# Patient Record
Sex: FEMALE | Race: OTHER | Employment: FULL TIME | URBAN - METROPOLITAN AREA
[De-identification: names, ages, dates, MRNs, and addresses within clinical notes are randomized per-mention and may not be internally consistent; named-entity substitution may affect disease eponyms.]

---

## 2017-02-24 ENCOUNTER — HOSPITAL ENCOUNTER (OUTPATIENT)
Age: 22
Discharge: HOME | End: 2017-02-24
Attending: FAMILY MEDICINE
Payer: COMMERCIAL

## 2017-02-24 VITALS
OXYGEN SATURATION: 98 % | TEMPERATURE: 98 F | HEART RATE: 104 BPM | SYSTOLIC BLOOD PRESSURE: 114 MMHG | DIASTOLIC BLOOD PRESSURE: 69 MMHG | RESPIRATION RATE: 16 BRPM

## 2017-02-24 DIAGNOSIS — K52.9 GASTROENTERITIS: Primary | ICD-10-CM

## 2017-02-24 DIAGNOSIS — R42 ORTHOSTATIC DIZZINESS: ICD-10-CM

## 2017-02-24 LAB
#LYMPHOCYTE IC: 0.3 X10ˆ3/UL (ref 0.9–3.2)
#MXD IC: 0.7 X10ˆ3/UL (ref 0.1–1)
#NEUTROPHIL IC: 10.4 X10ˆ3/UL (ref 1.3–6.7)
CREAT SERPL-MCNC: 0.7 MG/DL (ref 0.4–1)
GLUCOSE BLD-MCNC: 101 MG/DL (ref 65–99)
HCT IC: 40.4 % (ref 37–54)
HGB IC: 13.3 G/DL (ref 12–16)
ISTAT BLOOD GAS TCO2: 25 MMOL/L (ref 22–32)
ISTAT BUN: 12 MG/DL (ref 8–20)
ISTAT CHLORIDE: 102 MMOL/L (ref 101–111)
ISTAT HEMATOCRIT: 42 % (ref 37–54)
ISTAT IONIZED CALCIUM: 1.14 MMOL/L (ref 1.12–1.32)
ISTAT POTASSIUM: 3.9 MMOL/L (ref 3.6–5.1)
ISTAT SODIUM: 140 MMOL/L (ref 136–144)
MCH IC: 28.4 PG (ref 27–33.2)
MCHC IC: 32.9 G/DL (ref 31–37)
MCV IC: 86.3 FL (ref 81–100)
PLT IC: 270 X10ˆ3/UL (ref 150–450)
POCT BILIRUBIN URINE: NEGATIVE
POCT BLOOD URINE: NEGATIVE
POCT GLUCOSE URINE: NEGATIVE MG/DL
POCT KETONE URINE: 40 MG/DL
POCT LEUKOCYTE ESTERASE URINE: NEGATIVE
POCT NITRITE URINE: NEGATIVE
POCT PH URINE: 7 (ref 5–8)
POCT SPECIFIC GRAVITY URINE: 1.02
POCT URINE CLARITY: CLEAR
POCT URINE COLOR: YELLOW
POCT URINE PREGNANCY: NEGATIVE
POCT UROBILINOGEN URINE: 0.2 MG/DL
RBC IC: 4.68 X10ˆ6/UL (ref 3.8–5.1)
WBC IC: 11.4 X10ˆ3/UL (ref 4–13)

## 2017-02-24 PROCEDURE — 85025 COMPLETE CBC W/AUTO DIFF WBC: CPT | Performed by: FAMILY MEDICINE

## 2017-02-24 PROCEDURE — 81025 URINE PREGNANCY TEST: CPT | Performed by: FAMILY MEDICINE

## 2017-02-24 PROCEDURE — 99214 OFFICE O/P EST MOD 30 MIN: CPT

## 2017-02-24 PROCEDURE — 80047 BASIC METABLC PNL IONIZED CA: CPT

## 2017-02-24 PROCEDURE — 96361 HYDRATE IV INFUSION ADD-ON: CPT

## 2017-02-24 PROCEDURE — 81002 URINALYSIS NONAUTO W/O SCOPE: CPT | Performed by: FAMILY MEDICINE

## 2017-02-24 PROCEDURE — 96374 THER/PROPH/DIAG INJ IV PUSH: CPT

## 2017-02-24 RX ORDER — ONDANSETRON 4 MG/1
4 TABLET, FILM COATED ORAL EVERY 6 HOURS PRN
Qty: 12 TABLET | Refills: 0 | Status: SHIPPED | OUTPATIENT
Start: 2017-02-24 | End: 2017-02-27

## 2017-02-24 RX ORDER — DICYCLOMINE HCL 20 MG
20 TABLET ORAL ONCE
Status: COMPLETED | OUTPATIENT
Start: 2017-02-24 | End: 2017-02-24

## 2017-02-24 RX ORDER — DICYCLOMINE HCL 20 MG
20 TABLET ORAL
Qty: 12 TABLET | Refills: 0 | Status: SHIPPED | OUTPATIENT
Start: 2017-02-24 | End: 2017-02-27

## 2017-02-24 RX ORDER — SODIUM CHLORIDE 9 MG/ML
1000 INJECTION, SOLUTION INTRAVENOUS ONCE
Status: COMPLETED | OUTPATIENT
Start: 2017-02-24 | End: 2017-02-24

## 2017-02-24 RX ORDER — ONDANSETRON 2 MG/ML
4 INJECTION INTRAMUSCULAR; INTRAVENOUS ONCE
Status: COMPLETED | OUTPATIENT
Start: 2017-02-24 | End: 2017-02-24

## 2017-02-24 NOTE — ED INITIAL ASSESSMENT (HPI)
Patient states since 8 am- gassy and abdominal discomfort- upper mid abdomen  Patient states took Andrzej ol at - 8:30 am thinking she has menstrual cramping- medication didn't work  Belching  12:15 pm- patient states has loose stool with some relief  Patient

## 2017-02-24 NOTE — ED PROVIDER NOTES
Patient Seen in: THE [facility] In [facility] [address]    History   Patient presents with:  Flu    Stated Complaint: FLU LIKE SYMPTOMS    HPI  21-year-old female coming in with complaints of 4 episodes of diarrhea, no blood, and 2-3 episodes of vomiting t from today and agreed except as otherwise stated in HPI.     Physical Exam       ED Triage Vitals   BP 02/24/17 1532 106/56 mmHg   Pulse 02/24/17 1532 108   Resp 02/24/17 1532 16   Temp 02/24/17 1532 98.1 °F (36.7 °C)   Temp src 02/24/17 1532 Temporal   SpO Comments:   Provide patient with a stool kit and educated regarding sample collection at home  0.9%  NaCl infusion   Sig:   ondansetron HCl (ZOFRAN) injection 4 mg   Sig:   Dicyclomine HCl (BENTYL) tab 20 mg   Sig:   Ondansetron HCl (ZOFRAN) 4 mg tablet tablet, R-0    Dicyclomine HCl 20 MG Oral Tab  Take 1 tablet (20 mg total) by mouth 4 (four) times daily before meals and nightly., Normal, Disp-12 tablet, R-0

## 2017-02-26 ENCOUNTER — LAB ENCOUNTER (OUTPATIENT)
Dept: LAB | Facility: HOSPITAL | Age: 22
End: 2017-02-26
Attending: FAMILY MEDICINE
Payer: COMMERCIAL

## 2017-02-26 DIAGNOSIS — R42 ORTHOSTATIC DIZZINESS: ICD-10-CM

## 2017-02-26 DIAGNOSIS — K52.9 GASTROENTERITIS: ICD-10-CM

## 2017-02-26 PROCEDURE — 89055 LEUKOCYTE ASSESSMENT FECAL: CPT

## 2017-02-26 PROCEDURE — 82272 OCCULT BLD FECES 1-3 TESTS: CPT

## 2017-02-26 PROCEDURE — 87045 FECES CULTURE AEROBIC BACT: CPT

## 2017-02-26 PROCEDURE — 87427 SHIGA-LIKE TOXIN AG IA: CPT

## 2017-02-26 PROCEDURE — 87015 SPECIMEN INFECT AGNT CONCNTJ: CPT

## 2017-02-26 PROCEDURE — 87046 STOOL CULTR AEROBIC BACT EA: CPT

## 2017-02-27 NOTE — ED NOTES
Spoke w/ Jones from THE [facility] lab that C-Diff was canceled because it was formed stool.

## 2018-03-30 ENCOUNTER — HOSPITAL ENCOUNTER (OUTPATIENT)
Age: 23
Discharge: HOME | End: 2018-03-30
Payer: COMMERCIAL

## 2018-03-30 VITALS
TEMPERATURE: 98 F | RESPIRATION RATE: 16 BRPM | DIASTOLIC BLOOD PRESSURE: 72 MMHG | HEART RATE: 55 BPM | SYSTOLIC BLOOD PRESSURE: 113 MMHG | OXYGEN SATURATION: 100 %

## 2018-03-30 DIAGNOSIS — J02.0 STREP PHARYNGITIS: Primary | ICD-10-CM

## 2018-03-30 PROCEDURE — 99214 OFFICE O/P EST MOD 30 MIN: CPT

## 2018-03-30 PROCEDURE — 87430 STREP A AG IA: CPT

## 2018-03-30 PROCEDURE — 99213 OFFICE O/P EST LOW 20 MIN: CPT

## 2018-03-30 RX ORDER — AMOXICILLIN 875 MG/1
875 TABLET, COATED ORAL 2 TIMES DAILY
Qty: 20 TABLET | Refills: 0 | Status: SHIPPED | OUTPATIENT
Start: 2018-03-30 | End: 2018-04-09

## 2018-03-30 NOTE — ED PROVIDER NOTES
Patient Seen in: THE [facility] In [facility]    History   No chief complaint on file. Stated Complaint: poss strep, xtoday     HPI    59-year-old female who comes in today complaining of sore throat that began this morning.   She states it wilfrid adenopathy   Lungs: Clear to auscultation bilaterally, respirations unlabored. No wheezing, rales or rhonchi. Heart: NSR, S1, S2 present. No murmurs, rubs or gallops.   Skin: no rash           ED Course     Labs Reviewed   POCT RAPID STREP - Abnormal; Not

## 2018-03-30 NOTE — ED INITIAL ASSESSMENT (HPI)
c/o sore throat started this morning, hurts to swallow. Took some pain medication with some relief. Exposed to mother with strep throat.

## 2018-11-04 ENCOUNTER — HOSPITAL ENCOUNTER (OUTPATIENT)
Age: 23
Discharge: HOME | End: 2018-11-04
Attending: FAMILY MEDICINE
Payer: COMMERCIAL

## 2018-11-04 VITALS
HEART RATE: 93 BPM | DIASTOLIC BLOOD PRESSURE: 66 MMHG | RESPIRATION RATE: 20 BRPM | TEMPERATURE: 98 F | OXYGEN SATURATION: 97 % | SYSTOLIC BLOOD PRESSURE: 119 MMHG

## 2018-11-04 DIAGNOSIS — R51.9 SINUS HEADACHE: ICD-10-CM

## 2018-11-04 DIAGNOSIS — J01.00 ACUTE NON-RECURRENT MAXILLARY SINUSITIS: Primary | ICD-10-CM

## 2018-11-04 PROCEDURE — 99214 OFFICE O/P EST MOD 30 MIN: CPT

## 2018-11-04 PROCEDURE — 99213 OFFICE O/P EST LOW 20 MIN: CPT

## 2018-11-04 RX ORDER — FLUTICASONE PROPIONATE 50 MCG
2 SPRAY, SUSPENSION (ML) NASAL DAILY
Qty: 1 INHALER | Refills: 0 | Status: SHIPPED | OUTPATIENT
Start: 2018-11-04 | End: 2018-12-04

## 2018-11-04 RX ORDER — AMOXICILLIN 875 MG/1
875 TABLET, COATED ORAL EVERY 12 HOURS
Qty: 20 TABLET | Refills: 0 | Status: SHIPPED | OUTPATIENT
Start: 2018-11-04 | End: 2018-11-14

## 2018-11-04 NOTE — ED INITIAL ASSESSMENT (HPI)
C/o headache since Wednesday, hurts more when turning head side to side and looking down. With body aches and chills. Facial rashes started this morning.

## 2018-11-04 NOTE — ED PROVIDER NOTES
Patient Seen in: [facility]    History   Patient presents with:  Headache (neurologic)  Rash    Stated Complaint: FEVER/MIGRAINE     HPI  19-year-old female with her mom presents to immediate care with history of cold cough lan equal, round, and reactive to light. Right eye exhibits no discharge. Left eye exhibits no discharge. Neck: Normal range of motion. Neck supple. No thyromegaly present.    Cardiovascular: Normal rate, regular rhythm, normal heart sounds and intact distal tablet Refills: 0    Fluticasone Propionate 50 MCG/ACT Nasal Suspension  2 sprays by Nasal route daily.   Qty: 1 Inhaler Refills: 0

## 2019-04-05 ENCOUNTER — HOSPITAL ENCOUNTER (OUTPATIENT)
Dept: ULTRASOUND IMAGING | Age: 24
Discharge: HOME | End: 2019-04-05
Attending: FAMILY MEDICINE
Payer: COMMERCIAL

## 2019-04-05 DIAGNOSIS — N93.8 DYSFUNCTIONAL UTERINE BLEEDING: ICD-10-CM

## 2019-04-05 PROCEDURE — 76830 TRANSVAGINAL US NON-OB: CPT | Performed by: FAMILY MEDICINE

## 2019-04-05 PROCEDURE — 76856 US EXAM PELVIC COMPLETE: CPT | Performed by: FAMILY MEDICINE

## 2019-07-05 ENCOUNTER — OFFICE VISIT (OUTPATIENT)
Dept: OBGYN CLINIC | Facility: CLINIC | Age: 24
End: 2019-07-05
Payer: COMMERCIAL

## 2019-07-05 VITALS
DIASTOLIC BLOOD PRESSURE: 60 MMHG | SYSTOLIC BLOOD PRESSURE: 84 MMHG | WEIGHT: 174.63 LBS | BODY MASS INDEX: 32.97 KG/M2 | HEIGHT: 61 IN

## 2019-07-05 DIAGNOSIS — Z30.09 COUNSELING FOR BIRTH CONTROL, ORAL CONTRACEPTIVES: ICD-10-CM

## 2019-07-05 DIAGNOSIS — N92.0 MENORRHAGIA WITH REGULAR CYCLE: Primary | ICD-10-CM

## 2019-07-05 PROCEDURE — 87491 CHLMYD TRACH DNA AMP PROBE: CPT | Performed by: OBSTETRICS & GYNECOLOGY

## 2019-07-05 PROCEDURE — 99203 OFFICE O/P NEW LOW 30 MIN: CPT | Performed by: OBSTETRICS & GYNECOLOGY

## 2019-07-05 PROCEDURE — 87591 N.GONORRHOEAE DNA AMP PROB: CPT | Performed by: OBSTETRICS & GYNECOLOGY

## 2019-07-05 RX ORDER — NORETHINDRONE ACETATE AND ETHINYL ESTRADIOL 1; .02 MG/1; MG/1
1 TABLET ORAL DAILY
Qty: 1 PACKAGE | Refills: 3 | Status: SHIPPED | OUTPATIENT
Start: 2019-07-05 | End: 2019-11-05

## 2019-07-05 RX ORDER — ESCITALOPRAM OXALATE 10 MG/1
10 TABLET ORAL DAILY
COMMUNITY
End: 2020-12-01

## 2019-07-05 RX ORDER — CETIRIZINE HYDROCHLORIDE 10 MG/1
10 TABLET ORAL DAILY
COMMUNITY

## 2019-07-05 NOTE — PROGRESS NOTES
CC: Patient presents with:  Gyn Problem: new patient, heavy periods, wants to discuss birth control      HPI: Nika Barreto is a 24year old  here for consultation regarding her heavy periods and wanting to start on birth control.    Her mother is a positives and negatives noted in the the HPI. Objective:    BP (!) 84/60   Ht 61\"   Wt 174 lb 9.6 oz   LMP 06/16/2019 (Exact Date)   BMI 32.99 kg/m²   Physical Exam    Constitutional: She is oriented to person, place, and time.  She appears well-devel

## 2019-07-07 LAB
C TRACH DNA SPEC QL NAA+PROBE: NEGATIVE
N GONORRHOEA DNA SPEC QL NAA+PROBE: NEGATIVE

## 2019-07-11 ENCOUNTER — TELEPHONE (OUTPATIENT)
Dept: OBGYN CLINIC | Facility: CLINIC | Age: 24
End: 2019-07-11

## 2019-07-11 NOTE — TELEPHONE ENCOUNTER
Pt states pharmacy needs to clarify if birth control should be the 21 day or 28 day     Please contact [address] [place] [address], [address] [place] [address] [facility] [address], [phone], [phone]

## 2019-09-09 RX ORDER — NORETHINDRONE ACETATE AND ETHINYL ESTRADIOL 1; .02 MG/1; MG/1
1 TABLET ORAL DAILY
Qty: 1 PACKAGE | Refills: 3 | OUTPATIENT
Start: 2019-09-09

## 2019-09-09 NOTE — TELEPHONE ENCOUNTER
Last OV: 7/5/19 with Dr. Rachelle Marroquin   Last refill date: 7/5/19  Follow-up: 3 mos for med f/u  Next appt.: 10/14/19    rx on 7/5/19 was for 1 package with 3 refills sent to [facility] in [place]. Refill request received from [facility] in [place].  Request

## 2019-09-09 NOTE — TELEPHONE ENCOUNTER
Last OV: 7/5/19 with Dr. Rosa Roth for gyn problem  Last refill date: 7/5/19 1 pack with 3 refills  Follow-up: 3 mos for annual and med check  Next appt.: 10/14/19    Refill not appropriate.

## 2019-11-05 NOTE — TELEPHONE ENCOUNTER
Pt last seen 7/5/19 and was advised to return in 3 months for f/u. Please schedule appt and route to RN for refill.

## 2019-11-06 RX ORDER — NORETHINDRONE ACETATE AND ETHINYL ESTRADIOL 1; .02 MG/1; MG/1
1 TABLET ORAL DAILY
Qty: 21 TABLET | Refills: 0 | Status: SHIPPED | OUTPATIENT
Start: 2019-11-06 | End: 2019-11-08

## 2019-11-06 NOTE — TELEPHONE ENCOUNTER
Pt scheduled for this Friday.     Future Appointments   Date Time Provider An Annette   11/8/2019 11:00 AM JORGE Diop MD [facility] [address]

## 2019-11-08 ENCOUNTER — OFFICE VISIT (OUTPATIENT)
Dept: OBGYN CLINIC | Facility: CLINIC | Age: 24
End: 2019-11-08
Payer: COMMERCIAL

## 2019-11-08 VITALS
SYSTOLIC BLOOD PRESSURE: 100 MMHG | WEIGHT: 184.81 LBS | HEART RATE: 67 BPM | DIASTOLIC BLOOD PRESSURE: 70 MMHG | BODY MASS INDEX: 35 KG/M2

## 2019-11-08 DIAGNOSIS — Z30.09 GENERAL COUNSELING FOR PRESCRIPTION OF ORAL CONTRACEPTIVES: ICD-10-CM

## 2019-11-08 DIAGNOSIS — Z01.419 WELL WOMAN EXAM WITH ROUTINE GYNECOLOGICAL EXAM: Primary | ICD-10-CM

## 2019-11-08 DIAGNOSIS — Z12.4 CERVICAL CANCER SCREENING: ICD-10-CM

## 2019-11-08 PROCEDURE — 87491 CHLMYD TRACH DNA AMP PROBE: CPT | Performed by: OBSTETRICS & GYNECOLOGY

## 2019-11-08 PROCEDURE — 88175 CYTOPATH C/V AUTO FLUID REDO: CPT | Performed by: OBSTETRICS & GYNECOLOGY

## 2019-11-08 PROCEDURE — 99395 PREV VISIT EST AGE 18-39: CPT | Performed by: OBSTETRICS & GYNECOLOGY

## 2019-11-08 PROCEDURE — 87591 N.GONORRHOEAE DNA AMP PROB: CPT | Performed by: OBSTETRICS & GYNECOLOGY

## 2019-11-08 RX ORDER — NORETHINDRONE ACETATE AND ETHINYL ESTRADIOL 1; .02 MG/1; MG/1
1 TABLET ORAL DAILY
Qty: 63 TABLET | Refills: 3 | Status: SHIPPED | OUTPATIENT
Start: 2019-11-08 | End: 2020-11-04

## 2019-11-08 NOTE — PROGRESS NOTES
OB/GYN H&P     2019  11:36 AM    CC: Patient presents with: Follow - Up: rx bc. Pt has notice gain wt. HPI: Stna Hyatt is a 21year old  here for Nidhi Hugo 39  She is happy with her OCPs  Her periods are lighter and not painful.    Noted some w Abdominal: Soft. She exhibits no distension and no mass. There is no hepatosplenomegaly. There is no tenderness. There is no rebound and no guarding. Genitourinary:    Vagina and uterus normal.   No breast swelling, tenderness, discharge or bleeding.  C

## 2019-11-13 NOTE — PROGRESS NOTES
Reviewed result. Normal Pap and gonorrhea chlamydia. However on the Pap there is evidence of bacterial vaginosis please inform patient and send Flagyl 500 mg twice daily x7 days.

## 2019-11-14 RX ORDER — METRONIDAZOLE 500 MG/1
500 TABLET ORAL 2 TIMES DAILY
Qty: 14 TABLET | Refills: 0 | Status: SHIPPED | OUTPATIENT
Start: 2019-11-14 | End: 2020-12-01

## 2019-11-14 NOTE — PROGRESS NOTES
Patient returned call. Reported results and instructed on medication. Flagyl 500 mg po bid x7 days, #14 no refills sent to pharmacy on file. Allergies verified and pharmacy location verified before sending rx.   Instructed patient to call with any new or wo

## 2020-11-02 RX ORDER — NORETHINDRONE ACETATE AND ETHINYL ESTRADIOL 1; .02 MG/1; MG/1
1 TABLET ORAL DAILY
Qty: 21 TABLET | Refills: 0 | Status: CANCELLED | OUTPATIENT
Start: 2020-11-02

## 2020-11-04 RX ORDER — NORETHINDRONE ACETATE AND ETHINYL ESTRADIOL 1; .02 MG/1; MG/1
1 TABLET ORAL DAILY
Qty: 21 TABLET | Refills: 1 | Status: SHIPPED | OUTPATIENT
Start: 2020-11-04 | End: 2020-12-01

## 2020-11-04 NOTE — TELEPHONE ENCOUNTER
Scheduled patient for annual 12/12/2020.  2 packs sent to pharmacy to get through until that time. No further questions or concerns. Patient stated she can only do Saturdays.

## 2020-12-01 ENCOUNTER — OFFICE VISIT (OUTPATIENT)
Dept: OBGYN CLINIC | Facility: CLINIC | Age: 25
End: 2020-12-01
Payer: COMMERCIAL

## 2020-12-01 VITALS
BODY MASS INDEX: 32.8 KG/M2 | WEIGHT: 176 LBS | DIASTOLIC BLOOD PRESSURE: 78 MMHG | SYSTOLIC BLOOD PRESSURE: 116 MMHG | HEIGHT: 61.5 IN

## 2020-12-01 DIAGNOSIS — Z30.41 SURVEILLANCE FOR BIRTH CONTROL, ORAL CONTRACEPTIVES: ICD-10-CM

## 2020-12-01 DIAGNOSIS — Z01.419 WELL WOMAN EXAM WITH ROUTINE GYNECOLOGICAL EXAM: Primary | ICD-10-CM

## 2020-12-01 PROCEDURE — 99395 PREV VISIT EST AGE 18-39: CPT | Performed by: OBSTETRICS & GYNECOLOGY

## 2020-12-01 PROCEDURE — 3008F BODY MASS INDEX DOCD: CPT | Performed by: OBSTETRICS & GYNECOLOGY

## 2020-12-01 PROCEDURE — 3074F SYST BP LT 130 MM HG: CPT | Performed by: OBSTETRICS & GYNECOLOGY

## 2020-12-01 PROCEDURE — 3078F DIAST BP <80 MM HG: CPT | Performed by: OBSTETRICS & GYNECOLOGY

## 2020-12-01 RX ORDER — NORETHINDRONE ACETATE AND ETHINYL ESTRADIOL 1; .02 MG/1; MG/1
1 TABLET ORAL DAILY
Qty: 21 TABLET | Refills: 12 | Status: SHIPPED | OUTPATIENT
Start: 2020-12-01 | End: 2021-12-08

## 2020-12-01 NOTE — PROGRESS NOTES
OB/GYN H&P       CC: Patient presents with:  Physical: Annual - refills on OCP       HPI: George Casillas is a 31year old G0 here for annual exam    Her periods are q 28 days, bleeds for 4-5 days. Likes her OCPs  No issues. Working from home.  Works for appears well-developed and well-nourished. HENT:   Head: Normocephalic and atraumatic. Neck: Neck supple. No thyromegaly present. Cardiovascular: Normal rate. Pulmonary/Chest: Effort normal.   Abdominal: Soft.  She exhibits no distension and no mas

## 2021-04-01 DIAGNOSIS — Z23 NEED FOR VACCINATION: ICD-10-CM

## 2021-05-01 ENCOUNTER — IMMUNIZATION (OUTPATIENT)
Dept: LAB | Facility: HOSPITAL | Age: 26
End: 2021-05-01
Attending: EMERGENCY MEDICINE
Payer: COMMERCIAL

## 2021-05-01 DIAGNOSIS — Z23 NEED FOR VACCINATION: Primary | ICD-10-CM

## 2021-05-01 PROCEDURE — 0011A SARSCOV2 VAC 100MCG/0.5ML IM: CPT

## 2021-05-29 ENCOUNTER — IMMUNIZATION (OUTPATIENT)
Dept: LAB | Facility: HOSPITAL | Age: 26
End: 2021-05-29
Attending: EMERGENCY MEDICINE
Payer: COMMERCIAL

## 2021-05-29 DIAGNOSIS — Z23 NEED FOR VACCINATION: Primary | ICD-10-CM

## 2021-05-29 PROCEDURE — 0012A SARSCOV2 VAC 100MCG/0.5ML IM: CPT

## 2021-12-08 RX ORDER — NORETHINDRONE ACETATE AND ETHINYL ESTRADIOL 1; .02 MG/1; MG/1
1 TABLET ORAL DAILY
Qty: 21 TABLET | Refills: 1 | Status: SHIPPED | OUTPATIENT
Start: 2021-12-08 | End: 2022-01-31

## 2021-12-08 NOTE — TELEPHONE ENCOUNTER
Last OV: 12/1/20 with Dr. Jatinder Abdi for annual  Last refill date: 12/1/20  Follow-up: 1 year  Next appt. : 1/18/22    Refill sent

## 2022-01-18 ENCOUNTER — OFFICE VISIT (OUTPATIENT)
Dept: OBGYN CLINIC | Facility: CLINIC | Age: 27
End: 2022-01-18
Payer: COMMERCIAL

## 2022-01-18 VITALS
BODY MASS INDEX: 33.07 KG/M2 | SYSTOLIC BLOOD PRESSURE: 110 MMHG | WEIGHT: 175.19 LBS | HEIGHT: 61 IN | DIASTOLIC BLOOD PRESSURE: 82 MMHG

## 2022-01-18 DIAGNOSIS — Z11.3 SCREENING EXAMINATION FOR SEXUALLY TRANSMITTED DISEASE: ICD-10-CM

## 2022-01-18 DIAGNOSIS — Z01.419 WELL WOMAN EXAM WITH ROUTINE GYNECOLOGICAL EXAM: Primary | ICD-10-CM

## 2022-01-18 PROCEDURE — 99395 PREV VISIT EST AGE 18-39: CPT | Performed by: OBSTETRICS & GYNECOLOGY

## 2022-01-18 PROCEDURE — 3074F SYST BP LT 130 MM HG: CPT | Performed by: OBSTETRICS & GYNECOLOGY

## 2022-01-18 PROCEDURE — 3008F BODY MASS INDEX DOCD: CPT | Performed by: OBSTETRICS & GYNECOLOGY

## 2022-01-18 PROCEDURE — 87591 N.GONORRHOEAE DNA AMP PROB: CPT | Performed by: OBSTETRICS & GYNECOLOGY

## 2022-01-18 PROCEDURE — 87491 CHLMYD TRACH DNA AMP PROBE: CPT | Performed by: OBSTETRICS & GYNECOLOGY

## 2022-01-18 PROCEDURE — 3079F DIAST BP 80-89 MM HG: CPT | Performed by: OBSTETRICS & GYNECOLOGY

## 2022-01-18 NOTE — PROGRESS NOTES
OB/GYN H&P       CC: Patient presents with: Annual: pt wants to discuss BC and irregular cycle bleeding. Pt had blood clot last month cycle and had cycle last for 3 weeks. HPI: Félix Denise is a 31year old  here for well woman examination. Comment: socially    Drug use: Never      ROS:   A comprehensive 10 point review of systems was completed. Pertinent positives and negatives noted in the the HPI. Objective:    /82   Ht 61\"   Wt 175 lb 3.2 oz (79.5 kg)   LMP 01/04/2022   BMI 33. birth control. MD Ailyn        This note was created by [employer] recognition.  Errors in content may be related to improper recognition by the system; efforts to review and correct have been done but errors may still exist. Please contact me wi

## 2022-01-19 LAB
C TRACH DNA SPEC QL NAA+PROBE: NEGATIVE
N GONORRHOEA DNA SPEC QL NAA+PROBE: NEGATIVE

## 2022-01-31 RX ORDER — NORETHINDRONE ACETATE AND ETHINYL ESTRADIOL 1; .02 MG/1; MG/1
1 TABLET ORAL DAILY
Qty: 63 TABLET | Refills: 4 | Status: SHIPPED | OUTPATIENT
Start: 2022-01-31

## 2022-01-31 NOTE — TELEPHONE ENCOUNTER
Last OV: 1/18/22 with Dr. Eulogio Linder for annual  Last refill date: 12/8/21  Follow-up: 1 year  Next appt.: none scheduled; due 1/2023      Refill sent

## 2023-02-24 ENCOUNTER — TELEPHONE (OUTPATIENT)
Dept: OBGYN CLINIC | Facility: CLINIC | Age: 28
End: 2023-02-24

## 2023-02-24 NOTE — TELEPHONE ENCOUNTER
OCP refill    Last OV: 1/18/22 with Dr. Shawn Bourgeois for annual  Last refill date: 1/31/22  Follow-up: 1 year  Next appt.: none scheduled; due 1/2023    Patient over due for annual. Please contact her to schedule appt and then return to RN pool for refill.  Thank you

## 2023-05-20 ENCOUNTER — OFFICE VISIT (OUTPATIENT)
Dept: OBGYN CLINIC | Facility: CLINIC | Age: 28
End: 2023-05-20
Payer: COMMERCIAL

## 2023-05-20 VITALS
HEIGHT: 61.25 IN | HEART RATE: 85 BPM | BODY MASS INDEX: 30.9 KG/M2 | WEIGHT: 165.81 LBS | DIASTOLIC BLOOD PRESSURE: 68 MMHG | SYSTOLIC BLOOD PRESSURE: 112 MMHG

## 2023-05-20 DIAGNOSIS — M79.89 MASS OF SOFT TISSUE OF PELVIS: ICD-10-CM

## 2023-05-20 DIAGNOSIS — Z12.4 SCREENING FOR CERVICAL CANCER: ICD-10-CM

## 2023-05-20 DIAGNOSIS — Z01.419 ENCOUNTER FOR WELL WOMAN EXAM WITH ROUTINE GYNECOLOGICAL EXAM: Primary | ICD-10-CM

## 2023-05-20 PROCEDURE — 88175 CYTOPATH C/V AUTO FLUID REDO: CPT | Performed by: OBSTETRICS & GYNECOLOGY

## 2024-05-08 ENCOUNTER — HOSPITAL ENCOUNTER (OUTPATIENT)
Dept: ULTRASOUND IMAGING | Age: 29
Discharge: HOME | End: 2024-05-08
Attending: OBSTETRICS & GYNECOLOGY
Payer: COMMERCIAL

## 2024-05-08 DIAGNOSIS — M79.89 MASS OF SOFT TISSUE OF PELVIS: ICD-10-CM

## 2024-05-08 PROCEDURE — 76857 US EXAM PELVIC LIMITED: CPT | Performed by: OBSTETRICS & GYNECOLOGY

## 2024-05-11 DIAGNOSIS — M79.89 MASS OF SOFT TISSUE OF PELVIS: Primary | ICD-10-CM

## 2024-05-13 NOTE — PROGRESS NOTES
Patient notified of provider result review and recommendations via [employer] Message.     \"Written by Hope Dowd MD on 5/11/2024 10:34 AM CDT  Seen by patient Danica Block on 5/11/2024 10:39 AM\"    Provided with phone number for general surgery referral via [employer] message.

## 2024-07-27 ENCOUNTER — HOSPITAL ENCOUNTER (OUTPATIENT)
Dept: MRI IMAGING | Age: 29
Discharge: HOME | End: 2024-07-27
Attending: OBSTETRICS & GYNECOLOGY
Payer: COMMERCIAL

## 2024-07-27 DIAGNOSIS — M79.89 MASS OF SOFT TISSUE OF PELVIS: ICD-10-CM

## 2024-07-27 PROCEDURE — 72195 MRI PELVIS W/O DYE: CPT | Performed by: OBSTETRICS & GYNECOLOGY

## 2024-07-28 PROBLEM — N90.89 VULVAR MASS: Status: ACTIVE | Noted: 2024-07-28

## (undated) NOTE — MR AVS SNAPSHOT
After Visit Summary   11/8/2019    Stacy Nunez    MRN: [id number]           Visit Information     Date & Time  11/8/2019 11:00 AM Provider  Taina Saenz MD Department  [facility], [address], [place] Dept.  Phone  [phone] If you receive a survey from [employer], please take a few minutes to complete it and provide feedback. We strive to deliver the best patient experience and are looking for ways to make improvements. Your feedback will help us do so.  For more information

## (undated) NOTE — LETTER
19    Re: Shannan Dixon  : 1995    To Whom It May Concern:  Shannan Dixon is under my care for. Please excuse her from work . If you have any questions concerning this letter, please feel free to contact my office.       Sincerely yours,

## (undated) NOTE — ED AVS SNAPSHOT
[facility] in [address]    [address] [facility]    Phone:  [phone]    Fax:  [phone]           Portia Tiana   MRN: [id number]    Department:  THE [facility] in [address] [place] [address],[address]   Date of Visit:  2/24/2017 Rx Bentyl 20 mg every 6 hours only as needed  Stool kit provided - all tests ordered - please provide sample for testing.          Gastroenteritis   Zofran 4 mg every 8 hours as needed for nausea or vomiting   Adequate hydration with Pedialyte, free water o a substitute for ongoing medical care. Often, one Immediate Care visit does not uncover every injury or illness.  If you have been referred to a primary care or a specialist physician for a follow-up visit, please tell this physician (or your personal docto Anshul Catalan [phone] [facility] ([address]) [phone]                Additional Information       We are concerned for your overall well being:    - If you are a smoker or have smoked in the last 12 months, we encourage you to explore options for [facility] GUEVAR[facility]